# Patient Record
Sex: FEMALE | Race: WHITE
[De-identification: names, ages, dates, MRNs, and addresses within clinical notes are randomized per-mention and may not be internally consistent; named-entity substitution may affect disease eponyms.]

---

## 2020-08-27 ENCOUNTER — HOSPITAL ENCOUNTER (OUTPATIENT)
Dept: HOSPITAL 41 - JD.ED | Age: 70
Setting detail: OBSERVATION
LOS: 1 days | Discharge: HOME | End: 2020-08-28
Attending: FAMILY MEDICINE | Admitting: FAMILY MEDICINE
Payer: MEDICARE

## 2020-08-27 DIAGNOSIS — F17.210: ICD-10-CM

## 2020-08-27 DIAGNOSIS — S32.592A: Primary | ICD-10-CM
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
## 2020-08-27 DIAGNOSIS — Z86.69: ICD-10-CM

## 2020-08-27 DIAGNOSIS — E78.5: ICD-10-CM

## 2020-08-27 DIAGNOSIS — Z79.899: ICD-10-CM

## 2020-08-27 DIAGNOSIS — E83.42: ICD-10-CM

## 2020-08-27 DIAGNOSIS — E78.00: ICD-10-CM

## 2020-08-27 DIAGNOSIS — E87.6: ICD-10-CM

## 2020-08-27 DIAGNOSIS — I10: ICD-10-CM

## 2020-08-27 DIAGNOSIS — Z20.828: ICD-10-CM
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
## 2020-08-27 PROCEDURE — 96366 THER/PROPH/DIAG IV INF ADDON: CPT

## 2020-08-27 PROCEDURE — 97165 OT EVAL LOW COMPLEX 30 MIN: CPT

## 2020-08-27 PROCEDURE — 80053 COMPREHEN METABOLIC PANEL: CPT

## 2020-08-27 PROCEDURE — 96365 THER/PROPH/DIAG IV INF INIT: CPT

## 2020-08-27 PROCEDURE — 36415 COLL VENOUS BLD VENIPUNCTURE: CPT

## 2020-08-27 PROCEDURE — 97162 PT EVAL MOD COMPLEX 30 MIN: CPT

## 2020-08-27 PROCEDURE — 99284 EMERGENCY DEPT VISIT MOD MDM: CPT

## 2020-08-27 PROCEDURE — 83735 ASSAY OF MAGNESIUM: CPT

## 2020-08-27 PROCEDURE — 51701 INSERT BLADDER CATHETER: CPT

## 2020-08-27 PROCEDURE — 81003 URINALYSIS AUTO W/O SCOPE: CPT

## 2020-08-27 PROCEDURE — U0002 COVID-19 LAB TEST NON-CDC: HCPCS

## 2020-08-27 PROCEDURE — 96372 THER/PROPH/DIAG INJ SC/IM: CPT

## 2020-08-27 PROCEDURE — 97530 THERAPEUTIC ACTIVITIES: CPT

## 2020-08-27 PROCEDURE — 85025 COMPLETE CBC W/AUTO DIFF WBC: CPT

## 2020-08-27 PROCEDURE — G0378 HOSPITAL OBSERVATION PER HR: HCPCS

## 2020-08-27 PROCEDURE — 73502 X-RAY EXAM HIP UNI 2-3 VIEWS: CPT

## 2020-08-27 PROCEDURE — 84100 ASSAY OF PHOSPHORUS: CPT

## 2020-08-27 RX ADMIN — OXYCODONE HYDROCHLORIDE AND ACETAMINOPHEN PRN TAB: 5; 325 TABLET ORAL at 23:34

## 2020-08-27 NOTE — PCM.HP.2
H&P History of Present Illness





- General


Date of Service: 08/27/20


Admit Problem/Dx: 


                           Admission Diagnosis/Problem





Admission Diagnosis/Problem      Fracture of pelvis








Source of Information: Patient, Old Records, Provider, RN, RN Notes Reviewed


History Limitations: Reports: No Limitations





- History of Present Illness


Initial Comments - Free Text/Narative: 


This is a 70-year-old female who presents to ED on 8/27/2020 via [facility]

ambulance after tripping on her purse and falling, resulting in left hip pain.  

Patient reports she is from [place] visiting the [facility].  She reports 

[place] she tripped and landed on the 

concrete.  Denies having hit her head. She immediately noticed left hip pain and

a clicking sensation when she tried to move.  She reports a history of several 

broken bones but no osteoporosis.  She does have a history of hypertension 

hyperlipidemia.  Denies any current blood thinners.





In the ED she was afebrile with a temp of 36.3.  Pulse was 75.  Respirations 

were 18.  Blood pressure 156/91.  Pulse ox 98%.  CBC C was grossly unremarkable 

with a mildly elevated WBC of 11.59, likely due to stress reaction.  CMP was 

grossly unremarkable as well.  UA was obtained and was negative.  She is given 1

Norco tab for pain.  Hip x-rays obtained showing a superior and inferior pubic 

rami fracture on the left side along with osteopenia and degenerative change.  

Per the ED note they were attempting to have the patient ambulate with crutches 

and she was struggling.  Decision was then made to admit her observation status 

to the medical surgical floor for pain control and PT/OT eval. COVID-19 screen 

negative. 





  ** Left Hip


Pain Score (Numeric/FACES): 1





- Related Data


Allergies/Adverse Reactions: 


                                    Allergies











Allergy/AdvReac Type Severity Reaction Status Date / Time


 


No Known Allergies Allergy   Verified 08/27/20 10:50











Home Medications: 


                                    Home Meds





Aspirin/Acetaminophen/Caffeine [Excedrin Migraine Caplet] 1 tab PO DAILY PRN 

08/27/20 [History]


Bisoprolol [Zebeta] 10 mg PO DAILY 08/27/20 [History]


amLODIPine Besylate [Amlodipine Besylate] 10 mg PO DAILY 08/27/20 [History]


atorvaSTATin [Lipitor] 40 mg PO DAILY 08/27/20 [History]


traMADol [Ultram] 50 mg PO BID PRN 08/27/20 [History]











Past Medical History


HEENT History: Reports: Other (See Below)


Other HEENT History: sinus troubles


Cardiovascular History: Reports: High Cholesterol, Hypertension


Neurological History: Reports: Migraines





- Infectious Disease History


Infectious Disease History: Reports: Chicken Pox, Measles, Mumps





- Past Surgical History


HEENT Surgical History: Reports: Cataract Surgery


Musculoskeletal Surgical History: Reports: Other (See Below)


Other Musculoskeletal Surgeries/Procedures:: sx on L shoulder -pins placed. Pins

 placed to L ankle; Rt arm fx





Social & Family History





- Family History


Family Medical History: Noncontributory





- Tobacco Use


Smoking Status *Q: Current Every Day Smoker


Years of Tobacco use: 50


Packs/Tins Daily: 1





- Caffeine Use


Caffeine Use: Reports: Tea





- Recreational Drug Use


Recreational Drug Use: No





H&P Review of Systems





- Review of Systems:


Review Of Systems: See Below


General: Reports: Fatigue.  Denies: Fever, Chills, Malaise, Weakness


HEENT: Reports: No Symptoms.  Denies: Headaches, Sore Throat


Pulmonary: Reports: No Symptoms.  Denies: Shortness of Breath, Wheezing, Cough, 

Sputum


Cardiovascular: Reports: No Symptoms.  Denies: Chest Pain, Palpitations, Dyspnea

 on Exertion, Orthopnea, Edema


Gastrointestinal: Reports: No Symptoms.  Denies: Abdominal Pain, Constipation, 

Diarrhea, Flatus, Nausea, Vomiting


Genitourinary: Reports: No Symptoms.  Denies: Pain


Musculoskeletal: Reports: Joint Pain (Left hip with movement )


Skin: Reports: No Symptoms.  Denies: Cyanosis


Psychiatric: Reports: No Symptoms.  Denies: Confusion


Neurological: Reports: No Symptoms, Difficulty Walking, Gait Disturbance.  

Denies: Confusion, Headache, Numbness, Syncope, Tingling, Tremors, Trouble 

Speaking


Hematologic/Lymphatic: Reports: No Symptoms


Immunologic: Reports: No Symptoms





Exam





- Exam


Exam: See Below





- Vital Signs


Vital Signs: 


                                Last Vital Signs











Temp  98.1 F   08/27/20 15:16


 


Pulse  63   08/27/20 15:16


 


Resp  14   08/27/20 15:16


 


BP  156/94 H  08/27/20 15:16


 


Pulse Ox  99   08/27/20 15:16











Weight: 5 lb 4 oz





- Exam


Quality Assessment: DVT Prophylaxis.  No: Supplemental Oxygen, Urinary Catheter


General: Alert, Oriented, Cooperative.  No: Mild Distress


HEENT: Conjunctiva Clear, EACs Clear, Hearing Intact, Mucosa Moist & Pink, Nares

 Patent


Neck: Supple, Trachea Midline


Lungs: Clear to Auscultation, Normal Respiratory Effort


Cardiovascular: Regular Rate, Regular Rhythm


GI/Abdominal Exam: Normal Bowel Sounds, Soft, Non-Tender, No Distention


 (Female) Exam: Deferred


Rectal (Female) Exam: Deferred


Extremities: No Pedal Edema, Normal Capillary Refill, Leg Pain (left hip ), 

Limited Range of Motion (2/2 pain ), Other (Abrasion to left leg)


Peripheral Pulses: 2+: Radial (L), Radial (R), Dorsalis Pedis (L), Dorsalis 

Pedis (R)


Skin: Warm, Dry, Intact


Neurological: Cranial Nerves Intact (Grossly )


Neuro Extensive - Mental Status: Alert, Oriented x3, Normal Mood/Affect





- Patient Data


Lab Results Last 24 hrs: 


                         Laboratory Results - last 24 hr











  08/27/20 08/27/20 08/27/20 Range/Units





  11:26 11:40 11:40 


 


WBC   11.59 H   (3.98-10.04)  K/mm3


 


RBC   4.10   (3.98-5.22)  M/mm3


 


Hgb   14.7   (11.2-15.7)  gm/dl


 


Hct   41.6   (34.1-44.9)  %


 


MCV   101.5 H   (79.4-94.8)  fl


 


MCH   35.9 H   (25.6-32.2)  pg


 


MCHC   35.3   (32.2-35.5)  g/dl


 


RDW Std Deviation   57.5 H   (36.4-46.3)  fL


 


Plt Count   242   (182-369)  K/mm3


 


MPV   10.4   (9.4-12.3)  fl


 


Neut % (Auto)   71.7 H   (34.0-71.1)  %


 


Lymph % (Auto)   18.1 L   (19.3-51.7)  %


 


Mono % (Auto)   7.6   (4.7-12.5)  %


 


Eos % (Auto)   1.8   (0.7-5.8)  


 


Baso % (Auto)   0.8   (0.1-1.2)  %


 


Neut # (Auto)   8.31 H   (1.56-6.13)  K/mm3


 


Lymph # (Auto)   2.10   (1.18-3.74)  K/mm3


 


Mono # (Auto)   0.88 H   (0.24-0.36)  K/mm3


 


Eos # (Auto)   0.21   (0.04-0.36)  K/mm3


 


Baso # (Auto)   0.09 H   (0.01-0.08)  K/mm3


 


Sodium    139  (136-145)  mEq/L


 


Potassium    3.5  (3.5-5.1)  mEq/L


 


Chloride    103  ()  mEq/L


 


Carbon Dioxide    25  (21-32)  mEq/L


 


Anion Gap    14.5  (5-15)  


 


BUN    11  (7-18)  mg/dL


 


Creatinine    0.9  (0.55-1.02)  mg/dL


 


Est Cr Clr Drug Dosing    48.79  mL/min


 


Estimated GFR (MDRD)    > 60  (>60)  mL/min


 


BUN/Creatinine Ratio    12.2 L  (14-18)  


 


Glucose    106  ()  mg/dL


 


Calcium    9.5  (8.5-10.1)  mg/dL


 


Total Bilirubin    0.6  (0.2-1.0)  mg/dL


 


AST    20  (15-37)  U/L


 


ALT    20  (14-59)  U/L


 


Alkaline Phosphatase    70  ()  U/L


 


Total Protein    7.5  (6.4-8.2)  g/dl


 


Albumin    3.6  (3.4-5.0)  g/dl


 


Globulin    3.9  gm/dL


 


Albumin/Globulin Ratio    0.9 L  (1-2)  


 


Urine Color  Yellow    (Yellow)  


 


Urine Appearance  Clear    (Clear)  


 


Urine pH  7.0    (5.0-8.0)  


 


Ur Specific Gravity  1.015    (1.005-1.030)  


 


Urine Protein  Negative    (Negative)  


 


Urine Glucose (UA)  Negative    (Negative)  


 


Urine Ketones  Negative    (Negative)  


 


Urine Occult Blood  Negative    (Negative)  


 


Urine Nitrite  Negative    (Negative)  


 


Urine Bilirubin  Negative    (Negative)  


 


Urine Urobilinogen  0.2    (0.2-1.0)  


 


Ur Leukocyte Esterase  Negative    (Negative)  


 


COVID-19 ([facility])     (NEGATIVE)  














  08/27/20 Range/Units





  Unknown 


 


WBC   (3.98-10.04)  K/mm3


 


RBC   (3.98-5.22)  M/mm3


 


Hgb   (11.2-15.7)  gm/dl


 


Hct   (34.1-44.9)  %


 


MCV   (79.4-94.8)  fl


 


MCH   (25.6-32.2)  pg


 


MCHC   (32.2-35.5)  g/dl


 


RDW Std Deviation   (36.4-46.3)  fL


 


Plt Count   (182-369)  K/mm3


 


MPV   (9.4-12.3)  fl


 


Neut % (Auto)   (34.0-71.1)  %


 


Lymph % (Auto)   (19.3-51.7)  %


 


Mono % (Auto)   (4.7-12.5)  %


 


Eos % (Auto)   (0.7-5.8)  


 


Baso % (Auto)   (0.1-1.2)  %


 


Neut # (Auto)   (1.56-6.13)  K/mm3


 


Lymph # (Auto)   (1.18-3.74)  K/mm3


 


Mono # (Auto)   (0.24-0.36)  K/mm3


 


Eos # (Auto)   (0.04-0.36)  K/mm3


 


Baso # (Auto)   (0.01-0.08)  K/mm3


 


Sodium   (136-145)  mEq/L


 


Potassium   (3.5-5.1)  mEq/L


 


Chloride   ()  mEq/L


 


Carbon Dioxide   (21-32)  mEq/L


 


Anion Gap   (5-15)  


 


BUN   (7-18)  mg/dL


 


Creatinine   (0.55-1.02)  mg/dL


 


Est Cr Clr Drug Dosing   mL/min


 


Estimated GFR (MDRD)   (>60)  mL/min


 


BUN/Creatinine Ratio   (14-18)  


 


Glucose   ()  mg/dL


 


Calcium   (8.5-10.1)  mg/dL


 


Total Bilirubin   (0.2-1.0)  mg/dL


 


AST   (15-37)  U/L


 


ALT   (14-59)  U/L


 


Alkaline Phosphatase   ()  U/L


 


Total Protein   (6.4-8.2)  g/dl


 


Albumin   (3.4-5.0)  g/dl


 


Globulin   gm/dL


 


Albumin/Globulin Ratio   (1-2)  


 


Urine Color   (Yellow)  


 


Urine Appearance   (Clear)  


 


Urine pH   (5.0-8.0)  


 


Ur Specific Gravity   (1.005-1.030)  


 


Urine Protein   (Negative)  


 


Urine Glucose (UA)   (Negative)  


 


Urine Ketones   (Negative)  


 


Urine Occult Blood   (Negative)  


 


Urine Nitrite   (Negative)  


 


Urine Bilirubin   (Negative)  


 


Urine Urobilinogen   (0.2-1.0)  


 


Ur Leukocyte Esterase   (Negative)  


 


COVID-19 ([facility])  Negative  (NEGATIVE)  











Result Diagrams: 


                                 08/27/20 11:40





                                 08/27/20 11:40





Sepsis Event Note





- Evaluation


Sepsis Screening Result: No Definite Risk





- Focused Exam


Vital Signs: 


                                   Vital Signs











  Temp Temp Pulse Pulse Resp BP BP


 


 08/27/20 15:16  98.1 F   63   14  156/94 H 


 


 08/27/20 10:48   97.3 F   75  18   156/91 H














  Pulse Ox


 


 08/27/20 15:16  99


 


 08/27/20 10:48  98














- Problem List


(1) Pubic ramus fracture


SNOMED Code(s): 96794269


   ICD Code: S32.599A - OTH FRACTURE OF UNSP PUBIS, INIT ENCNTR FOR CLOSED 

FRACTURE   Status: Acute   Priority: High   Current Visit: Yes   


Qualifiers: 


   Encounter type: initial encounter   Fracture type: closed   Laterality: left 

  Qualified Code(s): S32.592A - Other specified fracture of left pubis, initial 

encounter for closed fracture   





(2) Fall


SNOMED Code(s): 1450226, 202380710


   ICD Code: W19.XXXA - UNSPECIFIED FALL, INITIAL ENCOUNTER   Status: Acute   

Priority: High   Current Visit: Yes   


Qualifiers: 


   Encounter type: initial encounter   Qualified Code(s): W19.XXXA - Unspecified

 fall, initial encounter   





(3) Hypertension


SNOMED Code(s): 64049329


   ICD Code: I10 - ESSENTIAL (PRIMARY) HYPERTENSION   Status: Chronic   

Priority: Medium   Current Visit: No   


Qualifiers: 


   Hypertension type: unspecified   Qualified Code(s): I10 - Essential (primary)

 hypertension   





(4) HLD (hyperlipidemia)


SNOMED Code(s): 29368134


   ICD Code: E78.5 - HYPERLIPIDEMIA, UNSPECIFIED   Status: Chronic   Priority: 

Low   Current Visit: No   


Qualifiers: 


   Hyperlipidemia type: unspecified   Qualified Code(s): E78.5 - Hyperlipidemia,

 unspecified   





(5) History of migraine


SNOMED Code(s): 411300739


   ICD Code: Z86.69 - PERSONAL HISTORY OF DIS OF THE NERVOUS SYS AND SENSE 

ORGANS   Status: Chronic   Priority: Low   Current Visit: No   


Problem List Initiated/Reviewed/Updated: Yes


Orders Last 24hrs: 


                               Active Orders 24 hr











 Category Date Time Status


 


 Patient Status [ADT] Routine ADT  08/27/20 14:16 Active


 


 Urinary Catheter Assessment [RC] ASDIRECTED Care  08/27/20 11:31 Active


 


 Urinary Catheter Insertion [Insert Urinary Catheter] [ Care  08/27/20 11:20 

Ordered





 OM.PC] Q24H   


 


 Regular Diet [DIET] Diet  08/27/20 Dinner Active


 


 Isolation [COMM] Routine Oth  08/27/20 16:33 Ordered











Assessment/Plan Comment:: 


8/27/20 - Admission assessment:





Pubic ramus fracture


Fall


* 69 yo female who tripped on her purse and fell on left side on concrete


* Denies hitting head


* Hip X-ray in ED shows superior and inferior pubic rami fracture


* Denies history of osteoporosis


* Not on any blood thinners


* Had difficulty with crutches in ED


* Lives in [place]


* On home Ultram


* Given Norco in ED x1 with mild improvement


* Reports pain while moving





Hypertension


HLD (hyperlipidemia)


* On home Bisoprolol, Amlodipine, and Lipitor


* No current concerns





History of migraine


* Takes Excedrin migraine at home


* No current concerns





Plan:


* Admit to medical floor observation status


* PT/OT


* Percocet PRN for pain


* CM/SW for discharge planning


* Continue home Bisoprolol, Amlodipine, and Lipitor


* Hold Excedrin and Ultram


* Spiritual care consult


* Senna 1 tab BID for constipation prevention





PCP: Out of area





DVT Prophylaxis: Lovenox





GI prophylaxis: Not indicated





Social: Patient is visiting area from [place] with friend





Disposition: Patient admitted to medical floor observation status. Patients 

nephew is driving from [place] and they are planning for discharge tomorrow. 





- Mortality Measure


Prognosis:: Good

## 2020-08-27 NOTE — EDM.PDOC
ED HPI GENERAL MEDICAL PROBLEM





- General


Chief Complaint: Lower Extremity Injury/Pain


Stated Complaint: [facility]


Time Seen by Provider: 08/27/20 10:40





- History of Present Illness


INITIAL COMMENTS - FREE TEXT/NARRATIVE: 





70-year-old female presents the emergency room with left hip pain after a fall.





Patient was brought in by [facility] with left hip pain.  She is 

visiting here from [place].  According to the patient and EMS she did not hit 

her head.  Pain is minimal until she tries to move.  Apparently the patient was 

walking tripped over her purse landed on her left hip now when she tries to move

her hip she feels a click.  Patient is currently treated for hypertension 

hyperlipidemia.  She is not on any blood thinners.


  ** Left Hip


Pain Score (Numeric/FACES): 1





- Related Data


                                    Allergies











Allergy/AdvReac Type Severity Reaction Status Date / Time


 


No Known Allergies Allergy   Verified 08/27/20 10:50











Home Meds: 


                                    Home Meds





Aspirin/Acetaminophen/Caffeine [Excedrin Migraine Caplet] 1 tab PO DAILY PRN 

08/27/20 [History]


Bisoprolol [Zebeta] 10 mg PO DAILY 08/27/20 [History]


amLODIPine Besylate [Amlodipine Besylate] 10 mg PO DAILY 08/27/20 [History]


atorvaSTATin [Lipitor] 40 mg PO DAILY 08/27/20 [History]


traMADol [Ultram] 50 mg PO BID PRN 08/27/20 [History]











Review of Systems





- Review of Systems


Review Of Systems: See Below


Constitutional: Reports: No Symptoms


Eyes: Reports: No Symptoms


Nose: Reports: No Symptoms


Mouth/Throat: Reports: No Symptoms


Respiratory: Reports: No Symptoms


Cardiovascular: Reports: No Symptoms


GI/Abdominal: Reports: No Symptoms


Genitourinary: Reports: No Symptoms


Musculoskeletal: Reports: Other (See history of present illness)


Skin: Reports: No Symptoms


Neurological: Reports: No Symptoms





ED EXAM, GENERAL





- Physical Exam


Exam: See Below


Exam Limited By: No Limitations


General Appearance: Alert, No Apparent Distress, Other (He has pain with trying 

to sit up but lying still or sitting still she does okay)


Head: Atraumatic, Normocephalic


Neck: Normal Inspection, Supple, Non-Tender, Full Range of Motion


Respiratory/Chest: No Respiratory Distress, Lungs Clear, Normal Breath Sounds


Cardiovascular: Regular Rate, Rhythm, No Edema, No Murmur


GI/Abdominal: Normal Bowel Sounds, Soft, Non-Tender


Back Exam: Normal Inspection, Other (She has some discomfort at the level of the

 sacrum on down on the left side not in the midline).  No: CVA Tenderness (L), 

CVA Tenderness (R), Vertebral Tenderness


Extremities: Other (It is uncomfortable trying to move her hip.  She has an 

abrasion just under her kneecap on the left side but her knee is not bothering 

her.)


Neurological: Alert, Oriented, Normal Cognition


Skin Exam: Warm





Course





- Vital Signs


Last Recorded V/S: 


                                Last Vital Signs











Temp  36.3 C   08/27/20 10:48


 


Pulse  75   08/27/20 10:48


 


Resp  18   08/27/20 10:48


 


BP  156/91 H  08/27/20 10:48


 


Pulse Ox  98   08/27/20 10:48














- Orders/Labs/Meds


Orders: 


                               Active Orders 24 hr











 Category Date Time Status


 


 Urinary Catheter Assessment [RC] ASDIRECTED Care  08/27/20 11:31 Active


 


 Urinary Catheter Insertion [Insert Urinary Catheter] [ Care  08/27/20 11:20 

Ordered





 OM.PC] Q24H   











Labs: 


                                Laboratory Tests











  08/27/20 08/27/20 08/27/20 Range/Units





  11:26 11:40 11:40 


 


WBC   11.59 H   (3.98-10.04)  K/mm3


 


RBC   4.10   (3.98-5.22)  M/mm3


 


Hgb   14.7   (11.2-15.7)  gm/dl


 


Hct   41.6   (34.1-44.9)  %


 


MCV   101.5 H   (79.4-94.8)  fl


 


MCH   35.9 H   (25.6-32.2)  pg


 


MCHC   35.3   (32.2-35.5)  g/dl


 


RDW Std Deviation   57.5 H   (36.4-46.3)  fL


 


Plt Count   242   (182-369)  K/mm3


 


MPV   10.4   (9.4-12.3)  fl


 


Neut % (Auto)   71.7 H   (34.0-71.1)  %


 


Lymph % (Auto)   18.1 L   (19.3-51.7)  %


 


Mono % (Auto)   7.6   (4.7-12.5)  %


 


Eos % (Auto)   1.8   (0.7-5.8)  


 


Baso % (Auto)   0.8   (0.1-1.2)  %


 


Neut # (Auto)   8.31 H   (1.56-6.13)  K/mm3


 


Lymph # (Auto)   2.10   (1.18-3.74)  K/mm3


 


Mono # (Auto)   0.88 H   (0.24-0.36)  K/mm3


 


Eos # (Auto)   0.21   (0.04-0.36)  K/mm3


 


Baso # (Auto)   0.09 H   (0.01-0.08)  K/mm3


 


Sodium    139  (136-145)  mEq/L


 


Potassium    3.5  (3.5-5.1)  mEq/L


 


Chloride    103  ()  mEq/L


 


Carbon Dioxide    25  (21-32)  mEq/L


 


Anion Gap    14.5  (5-15)  


 


BUN    11  (7-18)  mg/dL


 


Creatinine    0.9  (0.55-1.02)  mg/dL


 


Est Cr Clr Drug Dosing    48.79  mL/min


 


Estimated GFR (MDRD)    > 60  (>60)  mL/min


 


BUN/Creatinine Ratio    12.2 L  (14-18)  


 


Glucose    106  ()  mg/dL


 


Calcium    9.5  (8.5-10.1)  mg/dL


 


Total Bilirubin    0.6  (0.2-1.0)  mg/dL


 


AST    20  (15-37)  U/L


 


ALT    20  (14-59)  U/L


 


Alkaline Phosphatase    70  ()  U/L


 


Total Protein    7.5  (6.4-8.2)  g/dl


 


Albumin    3.6  (3.4-5.0)  g/dl


 


Globulin    3.9  gm/dL


 


Albumin/Globulin Ratio    0.9 L  (1-2)  


 


Urine Color  Yellow    (Yellow)  


 


Urine Appearance  Clear    (Clear)  


 


Urine pH  7.0    (5.0-8.0)  


 


Ur Specific Gravity  1.015    (1.005-1.030)  


 


Urine Protein  Negative    (Negative)  


 


Urine Glucose (UA)  Negative    (Negative)  


 


Urine Ketones  Negative    (Negative)  


 


Urine Occult Blood  Negative    (Negative)  


 


Urine Nitrite  Negative    (Negative)  


 


Urine Bilirubin  Negative    (Negative)  


 


Urine Urobilinogen  0.2    (0.2-1.0)  


 


Ur Leukocyte Esterase  Negative    (Negative)  











Meds: 


Medications














Discontinued Medications














Generic Name Dose Route Start Last Admin





  Trade Name Freq  PRN Reason Stop Dose Admin


 


Hydrocodone Bitart/Acetaminophen  1 tab  08/27/20 12:13  08/27/20 12:19





  Norco 325-5 Mg  PO  08/27/20 12:14  1 tab





  ONETIME ONE   Administration














- Re-Assessments/Exams


Free Text/Narrative Re-Assessment/Exam: 





08/27/20 13:47


Patient has a left-sided pubic ramus fracture superior inferior.  She is having 

a hard time getting around on crutches.  Case discussed with Dr. Dela Cruz, our 

hospitalist, who will assume care





Departure





- Departure


Time of Disposition: 13:48


Disposition: Refer to Observation


Clinical Impression: 


 Stress fracture of left pubic ramus








- Discharge Information


Referrals: 


PCP,None [Ordering Only Provider] - 


Forms:  ED Department Discharge





Sepsis Event Note (ED)





- Evaluation


Sepsis Screening Result: No Definite Risk





- Focused Exam


Vital Signs: 


                                   Vital Signs











  Temp Pulse Resp BP Pulse Ox


 


 08/27/20 10:48  36.3 C  75  18  156/91 H  98














- My Orders


Last 24 Hours: 


My Active Orders





08/27/20 11:20


Urinary Catheter Insertion [Insert Urinary Catheter] [OM.PC] Q24H 





08/27/20 11:31


Urinary Catheter Assessment [RC] ASDIRECTED 














- Assessment/Plan


Last 24 Hours: 


My Active Orders





08/27/20 11:20


Urinary Catheter Insertion [Insert Urinary Catheter] [OM.PC] Q24H 





08/27/20 11:31


Urinary Catheter Assessment [RC] ASDIRECTED

## 2020-08-27 NOTE — CR
Pelvis and left hip: AP view of the pelvis was obtained as well as AP 

and frog-leg lateral views left hip.

 

Fractures are noted within the inferior and superior pubic ramus on 

the left side.  Mild joint space narrowing is seen within both hips.  

Bony structures are osteopenic.  No additional fracture is 

appreciated.

 

Impression:

1.  Superior and inferior pubic rami fracture on the left side.

2.  Osteopenia and degenerative change.  

 

Diagnostic code #5

 

This report was dictated in MDT

## 2020-08-28 RX ADMIN — OXYCODONE HYDROCHLORIDE AND ACETAMINOPHEN PRN TAB: 5; 325 TABLET ORAL at 05:55

## 2020-08-28 RX ADMIN — OXYCODONE HYDROCHLORIDE AND ACETAMINOPHEN PRN TAB: 5; 325 TABLET ORAL at 10:48

## 2020-08-28 NOTE — [id number]
**Discharge Summary





- Hospital Course


HPI Initial Comments: 


This is a 70-year-old female who presents to ED on 8/27/2020 via [facility]

ambulance after tripping on her purse and falling, resulting in left hip pain.  

Patient reports she is from [place] visiting the [facility].  She reports 

they were [place] of the [employer] [place] when she tripped and landed on the 

concrete.  Denies having hit her head. She immediately noticed left hip pain and

a clicking sensation when she tried to move.  She reports a history of several 

broken bones but no osteoporosis.  She does have a history of hypertension 

hyperlipidemia.  Denies any current blood thinners.





In the ED she was afebrile with a temp of 36.3.  Pulse was 75.  Respirations 

were 18.  Blood pressure 156/91.  Pulse ox 98%.  CBC C was grossly unremarkable 

with a mildly elevated WBC of 11.59, likely due to stress reaction.  CMP was 

grossly unremarkable as well.  UA was obtained and was negative.  She is given 1

Norco tab for pain.  Hip x-rays obtained showing a superior and inferior pubic 

rami fracture on the left side along with osteopenia and degenerative change.  

Per the ED note they were attempting to have the patient ambulate with crutches 

and she was struggling.  Decision was then made to admit her observation status 

to the medical surgical floor for pain control and PT/OT eval. COVID-19 screen 

negative. 





Diagnosis: Stroke: No





- Discharge Data


Discharge Date: 08/28/20 (Admit date: 8/27/20)


Discharge Disposition: Home, Self-Care 01


Condition: Good





- Referral to Home Health


Primary Care Physician: 


PCP Not In Area








- Discharge Diagnosis/Problem(s)


(1) Pubic ramus fracture


SNOMED Code(s): 89114538


   ICD Code: S32.599A - OTH FRACTURE OF UNSP PUBIS, INIT ENCNTR FOR CLOSED 

FRACTURE   Status: Acute   Priority: High   


Qualifiers: 


   Encounter type: initial encounter   Fracture type: closed   Laterality: left 

 Qualified Code(s): S32.592A - Other specified fracture of left pubis, initial 

encounter for closed fracture   





(2) Fall


SNOMED Code(s): 4946671, 182577947


   ICD Code: W19.XXXA - UNSPECIFIED FALL, INITIAL ENCOUNTER   Status: Acute   

Priority: High   


Qualifiers: 


   Encounter type: initial encounter   Qualified Code(s): W19.XXXA - Unspecified

fall, initial encounter   





(3) Hypertension


SNOMED Code(s): 69915178


   ICD Code: I10 - ESSENTIAL (PRIMARY) HYPERTENSION   Status: Chronic   

Priority: Medium   


Qualifiers: 


   Hypertension type: unspecified   Qualified Code(s): I10 - Essential (primary)

hypertension   





(4) HLD (hyperlipidemia)


SNOMED Code(s): 15373719


   ICD Code: E78.5 - HYPERLIPIDEMIA, UNSPECIFIED   Status: Chronic   Priority: 

Low   


Qualifiers: 


   Hyperlipidemia type: unspecified   Qualified Code(s): E78.5 - Hyperlipidemia,

unspecified   





(5) History of migraine


SNOMED Code(s): 026493297


   ICD Code: Z86.69 - PERSONAL HISTORY OF DIS OF THE NERVOUS SYS AND SENSE 

ORGANS   Status: Chronic   Priority: Low   





(6) Hypokalemia


SNOMED Code(s): 40563593


   ICD Code: E87.6 - HYPOKALEMIA   Status: Acute   Priority: High   





(7) Hypomagnesemia


SNOMED Code(s): 768059056


   ICD Code: E83.42 - HYPOMAGNESEMIA   Status: Acute   Priority: High   





- Patient Summary/Data


Consults: 


                                  Consultations





08/27/20 17:20


Consult to Case Management/ [CONS] Routine 


Consult to Spiritual Care [CONS] Routine 


OT Evaluation and Treatment [CONS] Routine 


PT Evaluation and Treatment [CONS] Routine 











Labs Pending at D/C: 


None





Recommended Follow-up Testing/Procedures: 


Recommend follow-up with primary care provider within 7-10 days of discharge, 

sooner if needed.





Recommend follow-up with orthopedics within 14 days of discharge. 


Hospital Course: 


Trudi is admitted to the hospital floor after suffering a fall resulting in a 

inferior and superior pubic rami fracture.  In the ED she was having difficulty 

utilizing crutches and was having pain.  She was admitted observation status for

 pain control and to allow PT/OT's evaluate her in the morning.  PT/OT did see 

her and recommended she utilize a walker.  She was switched to Percocet for pain

 control with good response.  Potassium was low at 3.1 and magnesium was low at 

1.4.  She reports she is on home magnesium supplementation however she did not 

bring any with for her trip.  Because of this we will recommend 5 days of 400 mg

 twice daily magnesium and then she should resume her normal daily dosing.  

Potassium was supplemented here.  Sent a prescription for 325-5mg Percocet, 

which she should take every 6 hours.  She also prescribed senna twice daily 

scheduled.  She is recommended to utilize toe-touch weightbearing of her left 

foot.  Recommend follow-up with PCP within 5 to 7 days of discharge and 

orthopedics within 14 days of discharge.  Recommend she continue to use her 

incentive spirometer until she is more mobile.  We did discuss her smoking 

status.  She reports that she is an occasional smoker and since her fall she has

 not noticed any cravings.  We did discuss risk factors and other things that 

can trigger cravings.  She reported that she does not need nicotine patches nor 

does she want cessation counseling set up.  We discussed resources available 

such as her primary care provider and other tobacco cessation counseling, should

 she change her mind.  She was discharged and her nephew was going to give her a

 ride back to [place].  She was advised not to drive or operate machinery 

while on Percocet.








- Patient Instructions


Diet: Usual Diet as Tolerated


Activity: As Tolerated, No Strenuous Activities, Partial Weight Bearing (left 

foot as below )


Activity, Other: Toe touch weight bearing of left foot 


Driving: Do Not Drive (until cleared by PCP/Orthopedics)


Showering/Bathing: May Shower


Notify Provider of: Fever, Increased Pain, Nausea and/or Vomiting


Other/Special Instructions: Follow-up with primary care provider victor manuel 5-7 days

 of discharge, sooner if needed.  Recommend follow-up with orthopedics within 14

 days of discharge.  You were prescribed percocet, a scription narcotic pain 

medicine.  This should be used for moderate to severe pain.  You may take 

Tylenol and/or ibuprofen for mild pain per the 's dosing.  As we 

discussed, Percocet can make you sleepy and impair your ability to drive or 

operate machinery.  It also has the potential for addiction.  Attempt to wean 

off this as soon as you can.  Recommend follow-up with PT/OT once you get home. 

 Your magnesium and potassium were low here and were supplemented. You were sent

 a prescription for a few more days of magnesium supplementation.  Continue to 

utilize your incentive spirometer (Clear/Blue device you inhale through) until 

you are more mobile and/or off of percocet.  Recommend you get up and move 

around every 1-2 hours to prevent blood clots. This is especially important 

during your travels.  We discussed your smoking status and offered you a 

nicotine patch. You refused as you stated you have gone 2 days so far and feel 

pretty good about quitting. Smoking leads to multiple potential problems 

including negative effects on your bone health. We discussed triggers and danger

 situations, along with coping skills. You were advised on resources such as 

quitlines and your primary care provider, should you determine you need more 

assistance.  Should symptoms return or worsen contact primary care provider or 

return to the Emergency Department.





- Discharge Plan


*PRESCRIPTION DRUG MONITORING PROGRAM REVIEWED*: No


*COPY OF PRESCRIPTION DRUG MONITORING REPORT IN PATIENT EDWARD: No


Prescriptions/Med Rec: 


Magnesium Oxide [Magnesium] 400 mg PO BID 5 Days #10 tablet


Acetaminophen/oxyCODONE [Percocet 325-5 MG] 1 tab PO Q6H PRN #20 tablet


 PRN Reason: Moderate to severe pain 


Docusate Sodium/Sennosides [Senna Plus] 1 tab PO BID #40 tablet


Home Medications: 


                                    Home Meds





Aspirin/Acetaminophen/Caffeine [Excedrin Migraine Caplet] 1 tab PO DAILY PRN 

08/27/20 [History]


Bisoprolol [Zebeta] 10 mg PO DAILY 08/27/20 [History]


amLODIPine Besylate [Amlodipine Besylate] 10 mg PO DAILY 08/27/20 [History]


atorvaSTATin [Lipitor] 40 mg PO DAILY 08/27/20 [History]


Acetaminophen/oxyCODONE [Percocet 325-5 MG] 1 tab PO Q6H PRN #20 tablet 08/28/20

[Rx]


Docusate Sodium/Sennosides [Senna Plus] 1 tab PO BID #40 tablet 08/28/20 [Rx]


Magnesium Oxide [Magnesium] 400 mg PO BID 5 Days #10 tablet 08/28/20 [Rx]








Oxygen Therapy Mode: Room Air


Patient Handouts:  Simple Pelvic Fracture, Adult, Steps to Quit Smoking


Forms:  ED Department Discharge


Referrals: 


PCP,None [Ordering Only Provider] - 





- Discharge Summary/Plan Comment


DC Time >30 min.: Yes (45 mins )





- General Info


Date of Service: 08/28/20


Admission Dx/Problem (Free Text: 


                           Admission Diagnosis/Problem





Admission Diagnosis/Problem      Fracture of pelvis








Functional Status: Reports: Pain Controlled, Tolerating Diet, Ambulating, 

Urinating, Incentive Spirometry.  Denies: New Symptoms





- Review of Systems


General: Reports: No Symptoms.  Denies: Fever, Weakness, Malaise, Chills


HEENT: Reports: No Symptoms.  Denies: Headaches, Sore Throat


Pulmonary: Reports: No Symptoms.  Denies: Shortness of Breath, Pleuritic Chest 

Pain, Cough, Sputum, Wheezing


Cardiovascular: Reports: No Symptoms.  Denies: Chest Pain, Palpitations, Dyspnea

on Exertion, Orthopnea, Edema, Lightheadedness


Gastrointestinal: Reports: No Symptoms.  Denies: Abdominal Pain, Constipation, 

Diarrhea, Nausea, Vomiting


Genitourinary: Reports: No Symptoms.  Denies: Pain


Musculoskeletal: Reports: Joint Pain (left hip)


Skin: Reports: No Symptoms.  Denies: Cyanosis


Neurological: Reports: No Symptoms, Difficulty Walking, Gait Disturbance.  

Denies: Confusion, Numbness, Tingling, Weakness


Psychiatric: Reports: No Symptoms





- Patient Data


Vitals - Most Recent: 


                                Last Vital Signs











Temp  97.5 F   08/28/20 07:43


 


Pulse  71   08/28/20 07:43


 


Resp  16   08/28/20 07:43


 


BP  134/94 H  08/28/20 09:14


 


Pulse Ox  96   08/28/20 07:43











Weight - Most Recent: 148 lb


I&O - Last 24 hours: 


                                 Intake & Output











 08/27/20 08/28/20 08/28/20





 22:59 06:59 14:59


 


Intake Total  250 


 


Output Total  500 


 


Balance  -250 











Lab Results - Last 24 hrs: 


                         Laboratory Results - last 24 hr











  08/27/20 08/27/20 08/27/20 Range/Units





  11:26 11:40 11:40 


 


WBC   11.59 H   (3.98-10.04)  K/mm3


 


RBC   4.10   (3.98-5.22)  M/mm3


 


Hgb   14.7   (11.2-15.7)  gm/dl


 


Hct   41.6   (34.1-44.9)  %


 


MCV   101.5 H   (79.4-94.8)  fl


 


MCH   35.9 H   (25.6-32.2)  pg


 


MCHC   35.3   (32.2-35.5)  g/dl


 


RDW Std Deviation   57.5 H   (36.4-46.3)  fL


 


Plt Count   242   (182-369)  K/mm3


 


MPV   10.4   (9.4-12.3)  fl


 


Neut % (Auto)   71.7 H   (34.0-71.1)  %


 


Lymph % (Auto)   18.1 L   (19.3-51.7)  %


 


Mono % (Auto)   7.6   (4.7-12.5)  %


 


Eos % (Auto)   1.8   (0.7-5.8)  


 


Baso % (Auto)   0.8   (0.1-1.2)  %


 


Neut # (Auto)   8.31 H   (1.56-6.13)  K/mm3


 


Lymph # (Auto)   2.10   (1.18-3.74)  K/mm3


 


Mono # (Auto)   0.88 H   (0.24-0.36)  K/mm3


 


Eos # (Auto)   0.21   (0.04-0.36)  K/mm3


 


Baso # (Auto)   0.09 H   (0.01-0.08)  K/mm3


 


Sodium    139  (136-145)  mEq/L


 


Potassium    3.5  (3.5-5.1)  mEq/L


 


Chloride    103  ()  mEq/L


 


Carbon Dioxide    25  (21-32)  mEq/L


 


Anion Gap    14.5  (5-15)  


 


BUN    11  (7-18)  mg/dL


 


Creatinine    0.9  (0.55-1.02)  mg/dL


 


Est Cr Clr Drug Dosing    48.79  mL/min


 


Estimated GFR (MDRD)    > 60  (>60)  mL/min


 


BUN/Creatinine Ratio    12.2 L  (14-18)  


 


Glucose    106  ()  mg/dL


 


Calcium    9.5  (8.5-10.1)  mg/dL


 


Phosphorus     (2.6-4.7)  mg/dL


 


Magnesium     (1.8-2.4)  mg/dl


 


Total Bilirubin    0.6  (0.2-1.0)  mg/dL


 


AST    20  (15-37)  U/L


 


ALT    20  (14-59)  U/L


 


Alkaline Phosphatase    70  ()  U/L


 


Total Protein    7.5  (6.4-8.2)  g/dl


 


Albumin    3.6  (3.4-5.0)  g/dl


 


Globulin    3.9  gm/dL


 


Albumin/Globulin Ratio    0.9 L  (1-2)  


 


Urine Color  Yellow    (Yellow)  


 


Urine Appearance  Clear    (Clear)  


 


Urine pH  7.0    (5.0-8.0)  


 


Ur Specific Gravity  1.015    (1.005-1.030)  


 


Urine Protein  Negative    (Negative)  


 


Urine Glucose (UA)  Negative    (Negative)  


 


Urine Ketones  Negative    (Negative)  


 


Urine Occult Blood  Negative    (Negative)  


 


Urine Nitrite  Negative    (Negative)  


 


Urine Bilirubin  Negative    (Negative)  


 


Urine Urobilinogen  0.2    (0.2-1.0)  


 


Ur Leukocyte Esterase  Negative    (Negative)  


 


COVID-19 ([facility])     (NEGATIVE)  














  08/27/20 08/28/20 08/28/20 Range/Units





  Unknown 04:40 04:40 


 


WBC   8.28   (3.98-10.04)  K/mm3


 


RBC   3.78 L   (3.98-5.22)  M/mm3


 


Hgb   13.2  D   (11.2-15.7)  gm/dl


 


Hct   38.8   (34.1-44.9)  %


 


MCV   102.6 H   (79.4-94.8)  fl


 


MCH   34.9 H   (25.6-32.2)  pg


 


MCHC   34.0   (32.2-35.5)  g/dl


 


RDW Std Deviation   58.5 H   (36.4-46.3)  fL


 


Plt Count   224   (182-369)  K/mm3


 


MPV   10.8   (9.4-12.3)  fl


 


Neut % (Auto)   52.6   (34.0-71.1)  %


 


Lymph % (Auto)   33.7   (19.3-51.7)  %


 


Mono % (Auto)   11.2   (4.7-12.5)  %


 


Eos % (Auto)   1.9   (0.7-5.8)  


 


Baso % (Auto)   0.4   (0.1-1.2)  %


 


Neut # (Auto)   4.35   (1.56-6.13)  K/mm3


 


Lymph # (Auto)   2.79   (1.18-3.74)  K/mm3


 


Mono # (Auto)   0.93 H   (0.24-0.36)  K/mm3


 


Eos # (Auto)   0.16   (0.04-0.36)  K/mm3


 


Baso # (Auto)   0.03   (0.01-0.08)  K/mm3


 


Sodium    137  (136-145)  mEq/L


 


Potassium    3.1 L  (3.5-5.1)  mEq/L


 


Chloride    101  ()  mEq/L


 


Carbon Dioxide    25  (21-32)  mEq/L


 


Anion Gap    14.1  (5-15)  


 


BUN    12  (7-18)  mg/dL


 


Creatinine    0.8  (0.55-1.02)  mg/dL


 


Est Cr Clr Drug Dosing    2.39  mL/min


 


Estimated GFR (MDRD)    > 60  (>60)  mL/min


 


BUN/Creatinine Ratio    15.0  (14-18)  


 


Glucose    96  ()  mg/dL


 


Calcium    9.1  (8.5-10.1)  mg/dL


 


Phosphorus    3.8  (2.6-4.7)  mg/dL


 


Magnesium    1.4 L  (1.8-2.4)  mg/dl


 


Total Bilirubin    0.6  (0.2-1.0)  mg/dL


 


AST    15  (15-37)  U/L


 


ALT    13 L  (14-59)  U/L


 


Alkaline Phosphatase    48  ()  U/L


 


Total Protein    6.5  (6.4-8.2)  g/dl


 


Albumin    3.1 L  (3.4-5.0)  g/dl


 


Globulin    3.4  gm/dL


 


Albumin/Globulin Ratio    0.9 L  (1-2)  


 


Urine Color     (Yellow)  


 


Urine Appearance     (Clear)  


 


Urine pH     (5.0-8.0)  


 


Ur Specific Gravity     (1.005-1.030)  


 


Urine Protein     (Negative)  


 


Urine Glucose (UA)     (Negative)  


 


Urine Ketones     (Negative)  


 


Urine Occult Blood     (Negative)  


 


Urine Nitrite     (Negative)  


 


Urine Bilirubin     (Negative)  


 


Urine Urobilinogen     (0.2-1.0)  


 


Ur Leukocyte Esterase     (Negative)  


 


COVID-19 ([facility])  Negative    (NEGATIVE)  











Med Orders - Current: 


                               Current Medications





Acetaminophen (Tylenol)  650 mg PO Q4H PRN


   PRN Reason: Pain (Mild 1-3)/fever


Albuterol (Proventil Neb Soln)  0.63 mg NEB Q4HRRT PRN


   PRN Reason: shortness of breathe


Amlodipine Besylate (Norvasc)  10 mg PO DAILY [facility]


   Last Admin: 08/28/20 09:14 Dose:  10 mg


   Documented by: 


Enoxaparin Sodium (Lovenox)  40 mg SUBCUT DAILY [facility]


   Last Admin: 08/28/20 09:17 Dose:  40 mg


   Documented by: 


Metoprolol Succinate (Toprol Xl)  100 mg PO ONETIME PRN


   PRN Reason: 1 X SUB


Morphine Sulfate (Morphine)  2 mg IVPUSH Q2H PRN


   PRN Reason: Breakthrough Pain 


Non-Formulary Medication (Bisoprolol [Zebeta])  10 mg PO DAILY [facility]


Ondansetron HCl (Zofran)  4 mg IV Q6H PRN


   PRN Reason: Nausea/Vomiting


Oxycodone/Acetaminophen (Percocet 325-5 Mg)  1 tab PO Q4H PRN


   PRN Reason: Pain


   Last Admin: 08/28/20 05:55 Dose:  1 tab


   Documented by: 


Rosuvastatin Calcium (Crestor)  10 mg PO DAILY [facility]


   Last Admin: 08/28/20 09:13 Dose:  10 mg


   Documented by: 


Senna/Docusate Sodium (Senna Plus)  1 tab PO BID [facility]


   Last Admin: 08/28/20 09:13 Dose:  1 tab


   Documented by: 





Discontinued Medications





Hydrocodone Bitart/Acetaminophen (Norco 325-5 Mg)  1 tab PO ONETIME ONE


   Stop: 08/27/20 12:14


   Last Admin: 08/27/20 12:19 Dose:  1 tab


   Documented by: 


Hydrocodone Bitart/Acetaminophen (Norco 325-5 Mg)  1 tab PO Q4H PRN


   PRN Reason: Pain (moderate 4-6)


   Last Admin: 08/27/20 17:47 Dose:  1 tab


   Documented by: 


Magnesium Sulfate 2 gm/ Premix  50 mls @ 25 mls/hr IV ONETIME ONE


   Stop: 08/28/20 09:59


   Last Admin: 08/28/20 09:11 Dose:  25 mls/hr


   Documented by: 


Potassium Chloride (Klor-Con M20)  40 meq PO ONETIME ONE


   Stop: 08/28/20 08:01


   Last Admin: 08/28/20 09:13 Dose:  40 meq


   Documented by: 











- Exam


Quality Assessment: Reports: DVT Prophylaxis.  Denies: Supplemental Oxygen, 

Urine Catheter


General: Reports: Alert, Oriented, Cooperative, No Acute Distress


HEENT: Reports: Pupils Equal, Pupils Reactive, Mucous Membr. Moist/Pink


Neck: Reports: Supple, Trachea Midline


Lungs: Reports: Clear to Auscultation, Normal Respiratory Effort


Cardiovascular: Reports: Regular Rate, Regular Rhythm


GI/Abdominal Exam: Normal Bowel Sounds, Soft, Non-Tender, No Distention


 (Female) Exam: Deferred


Rectal (Female) Exam: Deferred


Extremities: Normal Inspection, No Pedal Edema, Normal Capillary Refill, Leg 

Pain (left hip ), Limited Range of Motion


Skin: Reports: Warm, Dry, Intact


Neurological: Reports: No New Focal Deficit


Psy/Mental Status: Reports: Alert, Normal Affect, Normal Mood